# Patient Record
Sex: MALE | Race: WHITE | URBAN - NONMETROPOLITAN AREA
[De-identification: names, ages, dates, MRNs, and addresses within clinical notes are randomized per-mention and may not be internally consistent; named-entity substitution may affect disease eponyms.]

---

## 2021-02-15 ENCOUNTER — IMMUNIZATIONS (OUTPATIENT)
Dept: FAMILY MEDICINE CLINIC | Facility: HOSPITAL | Age: 76
End: 2021-02-15

## 2021-02-15 DIAGNOSIS — Z23 ENCOUNTER FOR IMMUNIZATION: Primary | ICD-10-CM

## 2021-02-15 PROCEDURE — 0011A SARS-COV-2 / COVID-19 MRNA VACCINE (MODERNA) 100 MCG: CPT

## 2021-02-15 PROCEDURE — 91301 SARS-COV-2 / COVID-19 MRNA VACCINE (MODERNA) 100 MCG: CPT

## 2021-03-15 ENCOUNTER — IMMUNIZATIONS (OUTPATIENT)
Dept: FAMILY MEDICINE CLINIC | Facility: HOSPITAL | Age: 76
End: 2021-03-15

## 2021-03-15 DIAGNOSIS — Z23 ENCOUNTER FOR IMMUNIZATION: Primary | ICD-10-CM

## 2021-03-15 PROCEDURE — 0012A SARS-COV-2 / COVID-19 MRNA VACCINE (MODERNA) 100 MCG: CPT

## 2021-03-15 PROCEDURE — 91301 SARS-COV-2 / COVID-19 MRNA VACCINE (MODERNA) 100 MCG: CPT

## 2023-09-29 ENCOUNTER — APPOINTMENT (EMERGENCY)
Dept: CT IMAGING | Facility: HOSPITAL | Age: 78
End: 2023-09-29
Payer: MEDICARE

## 2023-09-29 ENCOUNTER — HOSPITAL ENCOUNTER (EMERGENCY)
Facility: HOSPITAL | Age: 78
Discharge: HOME | End: 2023-09-29
Attending: EMERGENCY MEDICINE
Payer: MEDICARE

## 2023-09-29 VITALS
OXYGEN SATURATION: 98 % | DIASTOLIC BLOOD PRESSURE: 97 MMHG | WEIGHT: 155 LBS | TEMPERATURE: 97.6 F | HEART RATE: 82 BPM | SYSTOLIC BLOOD PRESSURE: 224 MMHG | RESPIRATION RATE: 16 BRPM

## 2023-09-29 DIAGNOSIS — S16.1XXA STRAIN OF NECK MUSCLE, INITIAL ENCOUNTER: Primary | ICD-10-CM

## 2023-09-29 PROCEDURE — G1004 CDSM NDSC: HCPCS

## 2023-09-29 PROCEDURE — 72125 CT NECK SPINE W/O DYE: CPT

## 2023-09-29 PROCEDURE — 99283 EMERGENCY DEPT VISIT LOW MDM: CPT

## 2023-09-29 RX ORDER — METHOCARBAMOL 500 MG/1
500 TABLET, FILM COATED ORAL 2 TIMES DAILY
Qty: 20 TABLET | Refills: 0 | Status: SHIPPED | OUTPATIENT
Start: 2023-09-29

## 2023-09-29 RX ORDER — NAPROXEN 375 MG/1
375 TABLET ORAL 2 TIMES DAILY WITH MEALS
Qty: 20 TABLET | Refills: 0 | Status: SHIPPED | OUTPATIENT
Start: 2023-09-29

## 2023-09-29 RX ORDER — PREDNISONE 20 MG/1
40 TABLET ORAL DAILY
Qty: 10 TABLET | Refills: 0 | Status: SHIPPED | OUTPATIENT
Start: 2023-09-29 | End: 2023-10-04

## 2023-09-29 RX ORDER — LIDOCAINE 50 MG/G
1 PATCH TOPICAL DAILY
Qty: 7 PATCH | Refills: 0 | Status: SHIPPED | OUTPATIENT
Start: 2023-09-29 | End: 2023-10-06

## 2023-09-29 NOTE — ED PROVIDER NOTES
History  Chief Complaint   Patient presents with   • Neck Pain     Pt reports pain in lower neck radiating into R arm beginning Wednesday. Reports he woke from sleep with pain. Pain increased with ROM. No known injury. Hx C spine sx in 2014      Patient states that he woke up Wednesday morning and turned his head when he developed pain on the right side of his neck. Does not radiate to the right arm. Worse with turning his head to the right shoulder. Took Tylenol without relief. Does have a slight headache. However, did have headaches prior to this. No change in speech or vision. No focal weakness or numbness. No trauma. No trouble swallowing. No fevers or chills. No weakness or numbness. History provided by:  Patient   used: No    Neck Pain  Pain location:  R side  Quality:  Aching  Pain severity:  Mild  Pain is:  Same all the time  Onset quality:  Sudden  Duration:  2 days  Timing:  Constant  Progression:  Unchanged  Chronicity:  New  Context comment:  Woke up and turned his head when he developed pain  Relieved by:  Nothing  Exacerbated by: Turning his head to the right shoulder. Ineffective treatments: Tylenol. Associated symptoms: headaches    Associated symptoms: no bladder incontinence, no bowel incontinence, no chest pain, no fever, no numbness, no paresis, no visual change, no weakness and no weight loss        None       History reviewed. No pertinent past medical history. History reviewed. No pertinent surgical history. History reviewed. No pertinent family history. I have reviewed and agree with the history as documented. E-Cigarette/Vaping     E-Cigarette/Vaping Substances     Social History     Tobacco Use   • Smoking status: Never   • Smokeless tobacco: Never   Substance Use Topics   • Alcohol use: Yes     Comment: moderately   • Drug use: Never       Review of Systems   Constitutional: Negative for chills, fever and weight loss.    HENT: Negative for ear pain, hearing loss, sore throat, trouble swallowing and voice change. Eyes: Negative for pain and discharge. Respiratory: Negative for cough, shortness of breath and wheezing. Cardiovascular: Negative for chest pain and palpitations. Gastrointestinal: Negative for abdominal pain, blood in stool, bowel incontinence, constipation, diarrhea, nausea and vomiting. Genitourinary: Negative for bladder incontinence, dysuria, flank pain, frequency and hematuria. Musculoskeletal: Positive for neck pain. Negative for joint swelling and neck stiffness. Skin: Negative for rash and wound. Neurological: Positive for headaches. Negative for dizziness, seizures, syncope, facial asymmetry, weakness and numbness. Psychiatric/Behavioral: Negative for hallucinations, self-injury and suicidal ideas. All other systems reviewed and are negative. Physical Exam  Physical Exam  Vitals and nursing note reviewed. Constitutional:       General: He is not in acute distress. Appearance: He is well-developed. HENT:      Head: Normocephalic and atraumatic. Right Ear: External ear normal.      Left Ear: External ear normal.   Eyes:      General: No scleral icterus. Right eye: No discharge. Left eye: No discharge. Extraocular Movements: Extraocular movements intact. Conjunctiva/sclera: Conjunctivae normal.   Neck:      Vascular: No carotid bruit. Comments: Decreased range of motion secondary to pain. Pain is reproducible with turning his head to the right shoulder. There is no bruit or thrill noted along the carotids. Trachea is midline. Cardiovascular:      Rate and Rhythm: Normal rate and regular rhythm. Heart sounds: Normal heart sounds. No murmur heard. Pulmonary:      Effort: Pulmonary effort is normal.      Breath sounds: Normal breath sounds. No wheezing or rales. Abdominal:      General: Bowel sounds are normal. There is no distension.       Palpations: Abdomen is soft. Tenderness: There is no abdominal tenderness. There is no guarding or rebound. Musculoskeletal:         General: No deformity. Normal range of motion. Cervical back: No rigidity. Lymphadenopathy:      Cervical: No cervical adenopathy. Skin:     General: Skin is warm and dry. Findings: No rash. Neurological:      General: No focal deficit present. Mental Status: He is alert and oriented to person, place, and time. Cranial Nerves: No cranial nerve deficit. Psychiatric:         Mood and Affect: Mood normal.         Behavior: Behavior normal.         Thought Content: Thought content normal.         Judgment: Judgment normal.         Vital Signs  ED Triage Vitals [09/29/23 1651]   Temperature Pulse Respirations Blood Pressure SpO2   97.6 °F (36.4 °C) 82 16 (!) 224/97 98 %      Temp src Heart Rate Source Patient Position - Orthostatic VS BP Location FiO2 (%)   -- -- Lying Right arm --      Pain Score       6           Vitals:    09/29/23 1651   BP: (!) 224/97   Pulse: 82   Patient Position - Orthostatic VS: Lying         Visual Acuity      ED Medications  Medications - No data to display    Diagnostic Studies  Results Reviewed     None                 CT cervical spine without contrast   Final Result by Kiara Interiano MD (09/29 1828)         1. Age indeterminant kyphosis of the mid cervical spine centered at the C5 level possibly postoperative and spondylotic in etiology given the adjacent extensive spinal construct with anterior fusion C3-C4 and posterior fusion C4-C6. Hardware appears    intact. Correlation with prior imaging recommended. If prior images become available for direct comparison, an addendum could be issued. Further clinical assessment advised. 2. Advanced multilevel spondylosis. 3. No acute fracture.                   Workstation performed: [id number]                    Procedures  Procedures         ED Course                               SBIRT 22yo+ Flowsheet Row Most Recent Value   Initial Alcohol Screen: US AUDIT-C     1. How often do you have a drink containing alcohol? 0 Filed at: 09/29/2023 1700   2. How many drinks containing alcohol do you have on a typical day you are drinking? 0 Filed at: 09/29/2023 1700   3b. FEMALE Any Age, or MALE 65+: How often do you have 4 or more drinks on one occassion? 0 Filed at: 09/29/2023 1700   Audit-C Score 0 Filed at: 09/29/2023 1700   CORY: How many times in the past year have you. .. Used an illegal drug or used a prescription medication for non-medical reasons? Never Filed at: 09/29/2023 1700                    Medical Decision Making  Amount and/or Complexity of Data Reviewed  Radiology: ordered. Decision-making details documented in ED Course. Discussion of management or test interpretation with external provider(s): Differential diagnosis includes but not limited to carotid dissection versus cervical strain versus torticollis versus disc disease. Patient has no neurologic symptoms. Radial pulses are 2+ bilaterally. There is no thrill or bruit noted on exam.  Doubt carotid dissection. Disposition  Final diagnoses:   Strain of neck muscle, initial encounter     Time reflects when diagnosis was documented in both MDM as applicable and the Disposition within this note     Time User Action Codes Description Comment    9/29/2023  5:21 PM Komal Marrero Add [S16. 1XXA] Strain of neck muscle, initial encounter       ED Disposition     ED Disposition   Discharge    Condition   Stable    Date/Time   Fri Sep 29, 2023  5:21 PM    Comment   Butch Hodges discharge to home/self care.                Follow-up Information     Follow up With Specialties Details Why Contact Info Additional Information    Nathan Michelle MD Internal Medicine Call in 3 days  [address] [place] [address] [address] [facility] Physical Therapy Call in 1 day  [phone] Patient's Medications   Discharge Prescriptions    LIDOCAINE (LIDODERM) 5 %    Apply 1 patch topically over 12 hours daily for 7 days Apply to neck area of pain. Remove & Discard patch within 12 hours or as directed by MD       Start Date: 9/29/2023 End Date: 10/6/2023       Order Dose: 1 patch       Quantity: 7 patch    Refills: 0    METHOCARBAMOL (ROBAXIN) 500 MG TABLET    Take 1 tablet (500 mg total) by mouth 2 (two) times a day       Start Date: 9/29/2023 End Date: --       Order Dose: 500 mg       Quantity: 20 tablet    Refills: 0    NAPROXEN (NAPROSYN) 375 MG TABLET    Take 1 tablet (375 mg total) by mouth 2 (two) times a day with meals       Start Date: 9/29/2023 End Date: --       Order Dose: 375 mg       Quantity: 20 tablet    Refills: 0    PREDNISONE 20 MG TABLET    Take 2 tablets (40 mg total) by mouth daily for 5 days       Start Date: 9/29/2023 End Date: 10/4/2023       Order Dose: 40 mg       Quantity: 10 tablet    Refills: 0       No discharge procedures on file.     PDMP Review     None          ED Provider  Electronically Signed by           Tito Jordan MD  09/29/23 5380

## 2023-10-02 ENCOUNTER — TELEPHONE (OUTPATIENT)
Dept: PHYSICAL THERAPY | Facility: OTHER | Age: 78
End: 2023-10-02

## 2023-10-02 NOTE — TELEPHONE ENCOUNTER
Call placed to the patient per [facility] referral.    Voice message left for patient to call back. Phone number and hours of business provided. This is the 1st attempt to reach the patient. Will defer per protocol. Pt was in ED for neck strain on 9/29/23. No referral entered on AVS to call.      Pt did call on 9/30/23 and LM @9:53AM, and another message at 10:18AM

## 2023-10-06 ENCOUNTER — EVALUATION (OUTPATIENT)
Dept: PHYSICAL THERAPY | Facility: CLINIC | Age: 78
End: 2023-10-06
Payer: MEDICARE

## 2023-10-06 DIAGNOSIS — M54.2 ACUTE NECK PAIN: ICD-10-CM

## 2023-10-06 PROCEDURE — 97535 SELF CARE MNGMENT TRAINING: CPT

## 2023-10-06 PROCEDURE — 97161 PT EVAL LOW COMPLEX 20 MIN: CPT

## 2023-10-06 NOTE — PROGRESS NOTES
PT Evaluation     Today's date: 10/6/2023  Patient name: Marleny Salcedo  : 1945  MRN: [id number]  Referring provider: Karla Barrera PT  Dx:   Encounter Diagnosis     ICD-10-CM    1. Acute neck pain  M54.2 Ambulatory referral to PT spine                     Assessment  Assessment details: Pt is a 70year old male who presents to OP PT for acute neck pain. He reports that he woke up one more a week ago to pain in his R upper shoulder. He was seen in [address] [place] [address] ED and was referred to the comp spine program. Upon examination, patient presents with decreased cervical ROM, WFL UE strength and severe tenderness in UT and LS area of R side. Due to his current impairments patient has difficulty with cervical spine motions, ADLs and functioning at his prior level. Pt was very active going to the gym but has been unable to attend since this incident. He is most likely dealing with an acute ms spasm and would benefit from OP PT services in order to address current impairments and functional limitations. Thank you for your referral!    Impairments: abnormal or restricted ROM, activity intolerance, impaired physical strength, lacks appropriate home exercise program and pain with function    Goals  STG (to be met within 4 weeks):  1. Pt will reports no more than 1/10 pain at worst in order to improve function   2. Pt will improve cervical extension ROM by at least 5* in order to improve cervical mobility  3. Pt will improve cervical R& L lateral flexion ROM by at least 5* in order to improve cervical mobility   4. Pt will improve cervical R& L rotation ROM in order to improve cervical mobility  5. Pt will improve FOTO score by at least 5 points in order to progress to prior level of function    LTG (to be met in 8 weeks):  1. Pt will report no more than 0/10 pain at worst in order to complete ADLs  2. Pt will be able to tolerate work related activities without radicular symptoms in order to improve function  3.  Pt will be able to perform ADLs and hobbies without pain in order to return to PLOF  4. Pt will achieve FOTO discharge in order to improve QOL     Plan  Patient would benefit from: skilled physical therapy  Planned modality interventions: thermotherapy: hydrocollator packs and cryotherapy  Planned therapy interventions: joint mobilization, manual therapy, neuromuscular re-education, patient education, strengthening, stretching, therapeutic exercise, home exercise program and balance  Frequency: 2x week  Duration in weeks: 6  Treatment plan discussed with: patient        Subjective Evaluation    History of Present Illness  Mechanism of injury: Patient had fusion neck surgery in  and . About 6 months ago, he sees his neurologist and talked about a sharp pain that he would get on the R side of his head. He was told it was from his surgery. One week ago he had a shot of pain into his R shoulder and side of his R head. He contacted the neurologist again but he was not in. He was taken to the ED for CT scan by a friend, showing increased scar tissue and arthritis. Prescribed medications and notes that he is starting to feel a little better. Continues with pain in his upper shoulder area  Patient Goals  Patient goals for therapy: increased strength, independence with ADLs/IADLs, return to sport/leisure activities, decreased pain and increased motion    Pain  Current pain ratin  At best pain ratin  At worst pain ratin  Location: R UT, R temporal area  Quality: discomfort, dull ache and tight  Aggravating factors: overhead activity and lifting    Treatments  Current treatment: medication and physical therapy        Objective     Palpation     Right   Muscle spasm in the levator scapulae and upper trapezius. Tenderness of the levator scapulae, middle trapezius and upper trapezius.      Neurological Testing     Sensation   Cervical/Thoracic   Left   Intact: light touch    Right   Intact: light touch    Additional Neurological Details  Reports decreased sensation in hands from previous surgeries    Active Range of Motion   Cervical/Thoracic Spine       Cervical    Flexion:  [facility]  Extension: 48 degrees      Left lateral flexion: 17 degrees      Right lateral flexion: 31 degrees      Left rotation:  Restriction level: minimal  Right rotation:  [facility]    Strength/Myotome Testing     Left Shoulder     Planes of Motion   Flexion: 4   Abduction: 4   External rotation at 0°: 4-   Internal rotation at 0°: 4+     Right Shoulder     Planes of Motion   Flexion: 4   Abduction: 4   External rotation at 0°: 4-   Internal rotation at 0°: 4+              Precautions: PSH cervical fusion, lumbar laminectomy, CA  POC Expiration: 11/6/23  Manuals 10/6       Cervical stretching        STM R UT                        Neuro Re-Ed        Chin Tucks        Retractions @ wall        Damien push/pull                                        TherEx        UBE to improve  ROM/postural awareness        Caudel glide        LS stretch        Doorway stretch        Tband HA, B ER                                Instructed HEP & education 10'       Modalities

## 2023-10-06 NOTE — LETTER
2023    Indu Wheat, [address]  [address] [place] [facility].    Patient: Daksha Feliciano   YOB: 1945   Date of Visit: 10/6/2023     Encounter Diagnosis     ICD-10-CM    1. Acute neck pain  M54.2 Ambulatory referral to PT spine          Dear Dr. Laura Hodges:    Thank you for your recent referral of Daksha Feliciano. Please review the attached evaluation summary from Butch's recent visit. Please verify that you agree with the plan of care by signing the attached order. If you have any questions or concerns, please do not hesitate to call. I sincerely appreciate the opportunity to share in the care of one of your patients and hope to have another opportunity to work with you in the near future. Sincerely,    Quentin Wilkinson, PT      Referring Provider:      I certify that I have read the below Plan of Care and certify the need for these services furnished under this plan of treatment while under my care. Indu Wheat MD  [address] [address]  Via Fax: [phone]          PT Evaluation     Today's date: 10/6/2023  Patient name: Daksha Feliciano  : 1945  MRN: [id number]  Referring provider: Kal Sibley PT  Dx:   Encounter Diagnosis     ICD-10-CM    1. Acute neck pain  M54.2 Ambulatory referral to PT spine                     Assessment  Assessment details: Pt is a 70year old male who presents to OP PT for acute neck pain. He reports that he woke up one more a week ago to pain in his R upper shoulder. He was seen in [address] [place] [address] ED and was referred to the comp spine program. Upon examination, patient presents with decreased cervical ROM, WFL UE strength and severe tenderness in UT and LS area of R side. Due to his current impairments patient has difficulty with cervical spine motions, ADLs and functioning at his prior level. Pt was very active going to the gym but has been unable to attend since this incident.  He is most likely dealing with an acute ms spasm and would benefit from OP PT services in order to address current impairments and functional limitations. Thank you for your referral!    Impairments: abnormal or restricted ROM, activity intolerance, impaired physical strength, lacks appropriate home exercise program and pain with function    Goals  STG (to be met within 4 weeks):  1. Pt will reports no more than 1/10 pain at worst in order to improve function   2. Pt will improve cervical extension ROM by at least 5* in order to improve cervical mobility  3. Pt will improve cervical R& L lateral flexion ROM by at least 5* in order to improve cervical mobility   4. Pt will improve cervical R& L rotation ROM in order to improve cervical mobility  5. Pt will improve FOTO score by at least 5 points in order to progress to prior level of function    LTG (to be met in 8 weeks):  1. Pt will report no more than 0/10 pain at worst in order to complete ADLs  2. Pt will be able to tolerate work related activities without radicular symptoms in order to improve function  3. Pt will be able to perform ADLs and hobbies without pain in order to return to PLOF  4. Pt will achieve FOTO discharge in order to improve QOL     Plan  Patient would benefit from: skilled physical therapy  Planned modality interventions: thermotherapy: hydrocollator packs and cryotherapy  Planned therapy interventions: joint mobilization, manual therapy, neuromuscular re-education, patient education, strengthening, stretching, therapeutic exercise, home exercise program and balance  Frequency: 2x week  Duration in weeks: 6  Treatment plan discussed with: patient        Subjective Evaluation    History of Present Illness  Mechanism of injury: Patient had fusion neck surgery in 2014 and 2015. About 6 months ago, he sees his neurologist and talked about a sharp pain that he would get on the R side of his head. He was told it was from his surgery.  One week ago he had a shot of pain into his R shoulder and side of his R head. He contacted the neurologist again but he was not in. He was taken to the ED for CT scan by a friend, showing increased scar tissue and arthritis. Prescribed medications and notes that he is starting to feel a little better. Continues with pain in his upper shoulder area  Patient Goals  Patient goals for therapy: increased strength, independence with ADLs/IADLs, return to sport/leisure activities, decreased pain and increased motion    Pain  Current pain ratin  At best pain ratin  At worst pain ratin  Location: R UT, R temporal area  Quality: discomfort, dull ache and tight  Aggravating factors: overhead activity and lifting    Treatments  Current treatment: medication and physical therapy        Objective     Palpation     Right   Muscle spasm in the levator scapulae and upper trapezius. Tenderness of the levator scapulae, middle trapezius and upper trapezius.      Neurological Testing     Sensation   Cervical/Thoracic   Left   Intact: light touch    Right   Intact: light touch    Additional Neurological Details  Reports decreased sensation in hands from previous surgeries    Active Range of Motion   Cervical/Thoracic Spine       Cervical    Flexion:  WFL  Extension: 48 degrees      Left lateral flexion: 17 degrees      Right lateral flexion: 31 degrees      Left rotation:  Restriction level: minimal  Right rotation:  [facility]    Strength/Myotome Testing     Left Shoulder     Planes of Motion   Flexion: 4   Abduction: 4   External rotation at 0°: 4-   Internal rotation at 0°: 4+     Right Shoulder     Planes of Motion   Flexion: 4   Abduction: 4   External rotation at 0°: 4-   Internal rotation at 0°: 4+             Precautions: PSH cervical fusion, lumbar laminectomy, CA  POC Expiration: 23  Manuals 10/6       Cervical stretching        [facility] R UT                        Neuro Re-Ed        Chin Fanta        Retractions @ wall        [place] push/pull TherEx        UBE to improve  ROM/postural awareness        Caudel glide        LS stretch        Doorway stretch        Tband HA, B ER                                Instructed HEP & education 10'       Modalities

## 2023-10-13 ENCOUNTER — OFFICE VISIT (OUTPATIENT)
Dept: PHYSICAL THERAPY | Facility: CLINIC | Age: 78
End: 2023-10-13
Payer: MEDICARE

## 2023-10-13 DIAGNOSIS — M54.2 ACUTE NECK PAIN: Primary | ICD-10-CM

## 2023-10-13 PROCEDURE — 97110 THERAPEUTIC EXERCISES: CPT

## 2023-10-13 PROCEDURE — 97112 NEUROMUSCULAR REEDUCATION: CPT

## 2023-10-13 PROCEDURE — 97140 MANUAL THERAPY 1/> REGIONS: CPT

## 2023-10-13 NOTE — PROGRESS NOTES
Daily Note     Today's date: 10/13/2023  Patient name: Rajani Parker  : 1945  MRN: [id number]  Referring provider: Nory Aguilar DO  Dx:   Encounter Diagnosis     ICD-10-CM    1. Acute neck pain  M54.2                      Subjective: Pt reports he is consistently performing stretches at home      Objective: See treatment diary below      Assessment:  Pt tolerated treatment session fairly well. Continues with ms spasm and tenderness in R UT. Verbal cueing to engage postural muscles and prevent shoulder hiking. Pt would benefit from continued OP PT services. Plan: Continue per plan of care. Precautions: PSH cervical fusion, lumbar laminectomy, CA  POC Expiration: 23  Manuals 10/6 10/13      Cervical stretching  8'      STM R UT  7'                      Neuro Re-Ed        [employer]        Retractions @ wall  15x :05      [place] push/pull  12# 10x ea                                      TherEx        UBE to improve  ROM/postural awareness  1.2 alt 8'      Caudel glide  :15x3 bilat      LS stretch  15x3 bilat      Doorway stretch  :15x3      Tband HA, B ER                                Instructed HEP & education 10'       Modalities                   Access Code: [email or web address]  URL: [email or web address]  Date: 10/13/2023  Prepared by: Dante Sharma    Exercises  - Standing Shoulder Horizontal Abduction with Resistance  - 2 x daily - 7 x weekly - 2 sets - 10 reps  - Shoulder External Rotation and Scapular Retraction with Resistance  - 2 x daily - 7 x weekly - 2 sets - 10 reps

## 2023-10-17 NOTE — PROGRESS NOTES
Daily Note     Today's date: 10/18/2023  Patient name: Emre James  : 1945  MRN: [id number]  Referring provider: Andre Burgess DO  Dx:   Encounter Diagnosis     ICD-10-CM    1. Acute neck pain  M54.2                      Subjective: Pt reports he is doing well with his stretching, wants to try gym workout today      Objective: See treatment diary below      Assessment:  Pt tolerated treatment session fairly well. Soft tissue improving as evidence by decreased ms spasm, cervical ROM also improving. Minimal v/c given for [place] exercises to prevent shoulder elevation. Pt would benefit from continued OP PT services. Plan: Continue per plan of care. Precautions: PSH cervical fusion, lumbar laminectomy, CA  POC Expiration: 23  Manuals 10/6 10/13 10/18     Cervical stretching  8' 8'     STM R UT  7' 7'                     Neuro Re-Ed        Chin Tucks        Retractions @ wall  15x :05 15x :05     Damien push/pull  12# 10x ea 14# 2x10 ea     Damien LPD   12# 2x10                             TherEx        UBE to improve  ROM/postural awareness  1.2 alt 8' 1.3 alt 8'     Caudel glide  :15x3 bilat HEP     LS stretch  15x3 bilat HEP     Doorway stretch  :15x3 :15x3      Tband HA, B ER   GTB 2x10                             Instructed HEP & education 8'  5'     Modalities                   Access Code: [email or web address]  URL: [email or web address]  Date: 10/13/2023  Prepared by: Katey Park    Exercises  - Standing Shoulder Horizontal Abduction with Resistance  - 2 x daily - 7 x weekly - 2 sets - 10 reps  - Shoulder External Rotation and Scapular Retraction with Resistance  - 2 x daily - 7 x weekly - 2 sets - 10 reps

## 2023-10-18 ENCOUNTER — OFFICE VISIT (OUTPATIENT)
Dept: PHYSICAL THERAPY | Facility: CLINIC | Age: 78
End: 2023-10-18
Payer: MEDICARE

## 2023-10-18 DIAGNOSIS — M54.2 ACUTE NECK PAIN: Primary | ICD-10-CM

## 2023-10-18 PROCEDURE — 97110 THERAPEUTIC EXERCISES: CPT

## 2023-10-18 PROCEDURE — 97140 MANUAL THERAPY 1/> REGIONS: CPT

## 2023-10-18 PROCEDURE — 97112 NEUROMUSCULAR REEDUCATION: CPT

## 2023-10-19 NOTE — PROGRESS NOTES
Daily Note     Today's date: 10/19/2023  Patient name: Rodger Jose  : 1945  MRN: [id number]  Referring provider: Fela Marquez, PT  Dx:   Encounter Diagnosis     ICD-10-CM    1. Acute neck pain  M54.2                      Subjective: Pt reports that he was able to go to the gym Wednesday and modify his program without issue      Objective: See treatment diary below      Assessment:  Pt tolerated treatment session fairly well. Responding well to manual stretching with improvements noted in cervical ROM. Also demonstrating improved techniques with less cueing during activity. Pt would benefit from continued OP PT services. Plan: Continue per plan of care. Precautions: PSH cervical fusion, lumbar laminectomy, CA  POC Expiration: 23  Manuals 10/6 10/13 10/18 10/20    Cervical stretching  8' 8' 15'    STM R UT  7' 7'                     Neuro Re-Ed        Chin Tucks        Retractions @ wall  15x :05 15x :05 15x :05    [place] push/pull  12# 10x ea 14# 2x10 ea 14# 2x10 ea    [place] LPD   12# 2x10 12# 2x10    Push/pull cart    10# 2 laps                    TherEx        UBE to improve  ROM/postural awareness  1.2 alt 8' 1.3 alt 8' 1.3 alt 10'    Caudel glide  :15x3 bilat HEP     LS stretch  15x3 bilat HEP     Doorway stretch  :15x3 :15x3  :15x3     Tband HA, B ER   GTB 2x10 GTB 2x10                            Instructed HEP & education 8'  5'     Modalities                   Access Code: [email or web address]  URL: [email or web address]  Date: 10/13/2023  Prepared by: Susana Barker    Exercises  - Standing Shoulder Horizontal Abduction with Resistance  - 2 x daily - 7 x weekly - 2 sets - 10 reps  - Shoulder External Rotation and Scapular Retraction with Resistance  - 2 x daily - 7 x weekly - 2 sets - 10 reps

## 2023-10-20 ENCOUNTER — OFFICE VISIT (OUTPATIENT)
Dept: PHYSICAL THERAPY | Facility: CLINIC | Age: 78
End: 2023-10-20
Payer: MEDICARE

## 2023-10-20 DIAGNOSIS — M54.2 ACUTE NECK PAIN: Primary | ICD-10-CM

## 2023-10-20 PROCEDURE — 97140 MANUAL THERAPY 1/> REGIONS: CPT

## 2023-10-20 PROCEDURE — 97112 NEUROMUSCULAR REEDUCATION: CPT

## 2023-10-20 PROCEDURE — 97110 THERAPEUTIC EXERCISES: CPT

## 2023-10-25 ENCOUNTER — OFFICE VISIT (OUTPATIENT)
Dept: PHYSICAL THERAPY | Facility: CLINIC | Age: 78
End: 2023-10-25
Payer: MEDICARE

## 2023-10-25 DIAGNOSIS — M54.2 ACUTE NECK PAIN: Primary | ICD-10-CM

## 2023-10-25 PROCEDURE — 97112 NEUROMUSCULAR REEDUCATION: CPT

## 2023-10-25 PROCEDURE — 97110 THERAPEUTIC EXERCISES: CPT

## 2023-10-25 PROCEDURE — 97140 MANUAL THERAPY 1/> REGIONS: CPT

## 2023-10-25 NOTE — PROGRESS NOTES
Daily Note     Today's date: 10/25/2023  Patient name: Justine Parnell  : 1945  MRN: [id number]  Referring provider: Crystal Lynne DO  Dx:   Encounter Diagnosis     ICD-10-CM    1. Acute neck pain  M54.2                      Subjective: Pt reports he was able to go to the gym 2x this week without incident      Objective: See treatment diary below      Assessment:  Pt tolerated treatment session fairly well. ROM improving and tolerance to UE strengthening exercises with postural awareness improving. Pt would benefit from continued OP PT services. Plan: Continue per plan of care. Precautions: PSH cervical fusion, lumbar laminectomy, CA  POC Expiration: 23  Manuals 10/6 10/13 10/18 10/20 10/25   Cervical stretching  8' 8' 15' 15'   STM R UT  7' 7'                     Neuro Re-Ed        [place] chop     7# 10x bilat   Retractions @ wall  15x :05 15x :05 15x :05 15x :05   [place] push/pull  12# 10x ea 14# 2x10 ea 14# 2x10 ea 14# 2x10 ea   [place] LPD   12# 2x10 12# 2x10 15# 2x10   Push/pull cart    10# 2 laps 10# 2 laps                   TherEx        UBE to improve  ROM/postural awareness  1.2 alt 8' 1.3 alt 8' 1.3 alt 10' 1.3 alt 10'   Caudel glide  :15x3 bilat HEP     LS stretch  15x3 bilat HEP     Doorway stretch  :15x3 :15x3  :15x3  :15x3    Tband HA, B ER   GTB 2x10 GTB 2x10 GTB 2x10                           Instructed HEP & education 8'  5'     Modalities                   Access Code: [email or web address]  URL: [email or web address]  Date: 10/13/2023  Prepared by: [facility]    Exercises  - Standing Shoulder Horizontal Abduction with Resistance  - 2 x daily - 7 x weekly - 2 sets - 10 reps  - Shoulder External Rotation and Scapular Retraction with Resistance  - 2 x daily - 7 x weekly - 2 sets - 10 reps

## 2023-10-27 ENCOUNTER — OFFICE VISIT (OUTPATIENT)
Dept: PHYSICAL THERAPY | Facility: CLINIC | Age: 78
End: 2023-10-27
Payer: MEDICARE

## 2023-10-27 DIAGNOSIS — M54.2 ACUTE NECK PAIN: Primary | ICD-10-CM

## 2023-10-27 PROCEDURE — 97110 THERAPEUTIC EXERCISES: CPT

## 2023-10-27 PROCEDURE — 97140 MANUAL THERAPY 1/> REGIONS: CPT

## 2023-10-27 PROCEDURE — 97112 NEUROMUSCULAR REEDUCATION: CPT

## 2023-10-27 NOTE — PROGRESS NOTES
Daily Note     Today's date: 10/27/2023  Patient name: Conner Swenson  : 1945  MRN: [id number]  Referring provider: Anh Krishna, PT  Dx:   Encounter Diagnosis     ICD-10-CM    1. Acute neck pain  M54.2                      Subjective: Pt reports he has been going to gym without issue      Objective: See treatment diary below      Assessment:  Pt tolerated treatment session fairly well. Tolerating activity very well without adverse effects. Overall progressing well towards goals. Pt would benefit from continued OP PT services. Plan: Continue per plan of care. Precautions: PSH cervical fusion, lumbar laminectomy, CA  POC Expiration: 23  Manuals 10/27 10/13 10/18 10/20 10/25   Cervical stretching 15' 8' 8' 15' 15'   STM R UT  7' 7'                     Neuro Re-Ed        [place] chop 7# 10x bilat    7# 10x bilat   Retractions @ wall 15x :05 15x :05 15x :05 15x :05 15x :05   [place] push/pull 4# 2x10 ea 12# 10x ea 14# 2x10 ea 14# 2x10 ea 14# 2x10 ea   [place] LPD 15# 2x10  12# 2x10 12# 2x10 15# 2x10   Push/pull cart 10# 2 laps   10# 2 laps 10# 2 laps                   TherEx        UBE to improve  ROM/postural awareness 1.3 alt 10' 1.2 alt 8' 1.3 alt 8' 1.3 alt 10' 1.3 alt 10'   Caudel glide  :15x3 bilat HEP     LS stretch  15x3 bilat HEP     Doorway stretch :15x3  :15x3 :15x3  :15x3  :15x3    Tband HA, B ER GTB 2x10  GTB 2x10 GTB 2x10 GTB 2x10                           Instructed HEP & education   5'     Modalities                   Access Code: [email or web address]  URL: [email or web address]  Date: 10/13/2023  Prepared by: Rajat Yusuf    Exercises  - Standing Shoulder Horizontal Abduction with Resistance  - 2 x daily - 7 x weekly - 2 sets - 10 reps  - Shoulder External Rotation and Scapular Retraction with Resistance  - 2 x daily - 7 x weekly - 2 sets - 10 reps

## 2023-10-31 NOTE — PROGRESS NOTES
PT Re-Evaluation     Today's date: 2023  Patient name: Radha Bain  : 1945  MRN: [id number]  Referring provider: Adamaris Solorzano DO  Dx:   Encounter Diagnosis     ICD-10-CM    1. Acute neck pain  M54.2                        Assessment  Assessment details: Pt has attended a total of 7 PT sessions and has made great progress towards goals. He has made progress regarding cervical ROM, UE strength and denies tenderness throughout R side neck. Pt would benefit from one additional OP PT session in order to effectively transition to HEP. Thank you! Impairments: lacks appropriate home exercise program    Goals  STG (to be met within 4 weeks):  1. Pt will reports no more than 1/10 pain at worst in order to improve function   met  2. Pt will improve cervical extension ROM by at least 5* in order to improve cervical mobility  met  3. Pt will improve cervical R& L lateral flexion ROM by at least 5* in order to improve cervical mobility   met  4. Pt will improve cervical R& L rotation ROM in order to improve cervical mobility  met  5. Pt will improve FOTO score by at least 5 points in order to progress to prior level of function  met    LTG (to be met in 8 weeks):  1. Pt will report no more than 0/10 pain at worst in order to complete ADLs  met  2. Pt will be able to tolerate work related activities without radicular symptoms in order to improve function  met  3. Pt will be able to perform ADLs and hobbies without pain in order to return to PLOF  met  4. Pt will achieve FOTO discharge in order to improve QOL   met        Subjective Evaluation    History of Present Illness  Mechanism of injury: Patient reports nearly 90% improvements since starting PT. He notes he has been returning to the gym without limitaitons.   Patient Goals  Patient goals for therapy: increased strength, independence with ADLs/IADLs, return to sport/leisure activities, decreased pain and increased motion    Pain  Current pain ratin  At best pain ratin  At worst pain ratin    Treatments  Current treatment: physical therapy        Objective     Palpation     Right   No palpable tenderness to the levator scapulae, middle trapezius and upper trapezius.      Neurological Testing     Sensation   Cervical/Thoracic   Left   Intact: light touch    Right   Intact: light touch    Additional Neurological Details  Reports decreased sensation in hands from previous surgeries    Active Range of Motion   Cervical/Thoracic Spine       Cervical    Flexion:  WFL  Extension: 44 degrees      Left lateral flexion: 24 degrees      Right lateral flexion: 31 degrees      Left rotation:  WFL  Right rotation:  [facility]    Strength/Myotome Testing     Left Shoulder     Planes of Motion   Flexion: 4+   Abduction: 4+   External rotation at 0°: 4+   Internal rotation at 0°: 4+     Right Shoulder     Planes of Motion   Flexion: 4+   Abduction: 4+   External rotation at 0°: 4+   Internal rotation at 0°: 4+              Precautions: PSH cervical fusion, lumbar laminectomy, CA  POC Expiration: 23  Manuals 10/27 11/1 10/18 10/20 10/25   Cervical stretching 15' 15' 8' 15' 15'   STM R UT   7'                     Neuro Re-Ed        [place] chop 7# 10x bilat 7# 10x bilat    7# 10x bilat   Retractions @ wall 15x :05 15x :05 15x :05 15x :05 15x :05   [place] push/pull 14# 2x10 ea 14# 2x10 ea 14# 2x10 ea 14# 2x10 ea 14# 2x10 ea   [place] LPD 15# 2x10 15# 2x10 12# 2x10 12# 2x10 15# 2x10   Push/pull cart 10# 2 laps NT  10# 2 laps 10# 2 laps                   TherEx        UBE to improve  ROM/postural awareness 1.3 alt 10' 1.3 alt 10' 1.3 alt 8' 1.3 alt 10' 1.3 alt 10'   Caudel glide   HEP     LS stretch   HEP     Doorway stretch :15x3  :15x3 :15x3  :15x3  :15x3    Tband HA, B ER GTB 2x10 GTB 2x10 GTB 2x10 GTB 2x10 GTB 2x10                           Instructed HEP & education   5'     Modalities

## 2023-11-01 ENCOUNTER — EVALUATION (OUTPATIENT)
Dept: PHYSICAL THERAPY | Facility: CLINIC | Age: 78
End: 2023-11-01
Payer: MEDICARE

## 2023-11-01 DIAGNOSIS — M54.2 ACUTE NECK PAIN: Primary | ICD-10-CM

## 2023-11-01 PROCEDURE — 97110 THERAPEUTIC EXERCISES: CPT

## 2023-11-01 PROCEDURE — 97112 NEUROMUSCULAR REEDUCATION: CPT

## 2023-11-01 PROCEDURE — 97140 MANUAL THERAPY 1/> REGIONS: CPT

## 2023-11-01 NOTE — LETTER
2023    Yennifer Osorio DO  [address] [place] [id number]    Patient: Inocencia Granados   YOB: 1945   Date of Visit: 2023     Encounter Diagnosis     ICD-10-CM    1. Acute neck pain  M54.2           Dear Dr. Tera Rust:    Thank you for your recent referral of Inocencia Granados. Please review the attached evaluation summary from Butch's recent visit. Please verify that you agree with the plan of care by signing the attached order. If you have any questions or concerns, please do not hesitate to call. I sincerely appreciate the opportunity to share in the care of one of your patients and hope to have another opportunity to work with you in the near future. Sincerely,    Natalie Felix, PT      Referring Provider:      I certify that I have read the below Plan of Care and certify the need for these services furnished under this plan of treatment while under my care. Yennifer Osorio DO  [address] [place] [id number]  Via Fax: [phone]          PT Re-Evaluation     Today's date: 10/6/2023  Patient name: Inocencia Granados  : 1945  MRN: [id number]  Referring provider: Yennifer Osorio DO  Dx:   Encounter Diagnosis     ICD-10-CM    1. Acute neck pain  M54.2                        Assessment  Assessment details: Pt has attended a total of 7 PT sessions and has made great progress towards goals. He has made progress regarding cervical ROM, UE strength and denies tenderness throughout R side neck. Pt would benefit from one additional OP PT session in order to effectively transition to HEP. Thank you! Impairments: lacks appropriate home exercise program    Goals  STG (to be met within 4 weeks):  1. Pt will reports no more than 1/10 pain at worst in order to improve function   met  2. Pt will improve cervical extension ROM by at least 5* in order to improve cervical mobility  met  3.  Pt will improve cervical R& L lateral flexion ROM by at least 5* in order to improve cervical mobility   met  4. Pt will improve cervical R& L rotation ROM in order to improve cervical mobility  met  5. Pt will improve FOTO score by at least 5 points in order to progress to prior level of function  met    LTG (to be met in 8 weeks):  1. Pt will report no more than 0/10 pain at worst in order to complete ADLs  met  2. Pt will be able to tolerate work related activities without radicular symptoms in order to improve function  met  3. Pt will be able to perform ADLs and hobbies without pain in order to return to PLOF  met  4. Pt will achieve FOTO discharge in order to improve QOL   met        Subjective Evaluation    History of Present Illness  Mechanism of injury: Patient reports nearly 90% improvements since starting PT. He notes he has been returning to the gym without limitaitons. Patient Goals  Patient goals for therapy: increased strength, independence with ADLs/IADLs, return to sport/leisure activities, decreased pain and increased motion    Pain  Current pain ratin  At best pain ratin  At worst pain ratin    Treatments  Current treatment: physical therapy        Objective     Palpation     Right   No palpable tenderness to the levator scapulae, middle trapezius and upper trapezius.      Neurological Testing     Sensation   Cervical/Thoracic   Left   Intact: light touch    Right   Intact: light touch    Additional Neurological Details  Reports decreased sensation in hands from previous surgeries    Active Range of Motion   Cervical/Thoracic Spine       Cervical    Flexion:  WFL  Extension: 44 degrees      Left lateral flexion: 24 degrees      Right lateral flexion: 31 degrees      Left rotation:  WFL  Right rotation:  [facility]    Strength/Myotome Testing     Left Shoulder     Planes of Motion   Flexion: 4+   Abduction: 4+   External rotation at 0°: 4+   Internal rotation at 0°: 4+     Right Shoulder     Planes of Motion   Flexion: 4+   Abduction: 4+   External rotation at 0°: 4+   Internal rotation at 0°: 4+              Precautions: PSH cervical fusion, lumbar laminectomy, CA  POC Expiration: 12/1/23  Manuals 10/27 11/1 10/18 10/20 10/25   Cervical stretching 15' 15' 8' 15' 15'   STM R UT   7'                     Neuro Re-Ed        [place] chop 7# 10x bilat 7# 10x bilat    7# 10x bilat   Retractions @ wall 15x :05 15x :05 15x :05 15x :05 15x :05   [place] push/pull 14# 2x10 ea 14# 2x10 ea 14# 2x10 ea 14# 2x10 ea 14# 2x10 ea   [place] LPD 15# 2x10 15# 2x10 12# 2x10 12# 2x10 15# 2x10   Push/pull cart 10# 2 laps NT  10# 2 laps 10# 2 laps                   TherEx        UBE to improve  ROM/postural awareness 1.3 alt 10' 1.3 alt 10' 1.3 alt 8' 1.3 alt 10' 1.3 alt 10'   Caudel glide   HEP     LS stretch   HEP     Doorway stretch :15x3  :15x3 :15x3  :15x3  :15x3    Tband HA, B ER GTB 2x10 GTB 2x10 GTB 2x10 GTB 2x10 GTB 2x10                           Instructed HEP & education   5'     Modalities

## 2023-11-03 ENCOUNTER — OFFICE VISIT (OUTPATIENT)
Dept: PHYSICAL THERAPY | Facility: CLINIC | Age: 78
End: 2023-11-03
Payer: MEDICARE

## 2023-11-03 DIAGNOSIS — M54.2 ACUTE NECK PAIN: Primary | ICD-10-CM

## 2023-11-03 PROCEDURE — 97140 MANUAL THERAPY 1/> REGIONS: CPT

## 2023-11-03 PROCEDURE — 97535 SELF CARE MNGMENT TRAINING: CPT

## 2023-11-03 PROCEDURE — 97110 THERAPEUTIC EXERCISES: CPT

## 2023-11-03 NOTE — PROGRESS NOTES
PT Re-Evaluation     Today's date: 11/3/2023  Patient name: Stefany Reina  : 1945  MRN: [id number]  Referring provider: Margy Thomas DO  Dx:   Encounter Diagnosis     ICD-10-CM    1. Acute neck pain  M54.2                        Assessment  Assessment details: Pt has attended a total of 8 PT sessions and has made adequate progress towards goals to be d/c from PT services. PT reviewed and instructed HEP (handout provided) along with answering pt questions regarding current functional status. Pt has no concerns at time of discharge. Thank you! Goals  STG (to be met within 4 weeks):  1. Pt will reports no more than 1/10 pain at worst in order to improve function   met  2. Pt will improve cervical extension ROM by at least 5* in order to improve cervical mobility  met  3. Pt will improve cervical R& L lateral flexion ROM by at least 5* in order to improve cervical mobility   met  4. Pt will improve cervical R& L rotation ROM in order to improve cervical mobility  met  5. Pt will improve FOTO score by at least 5 points in order to progress to prior level of function  met    LTG (to be met in 8 weeks):  1. Pt will report no more than 0/10 pain at worst in order to complete ADLs  met  2. Pt will be able to tolerate work related activities without radicular symptoms in order to improve function  met  3. Pt will be able to perform ADLs and hobbies without pain in order to return to PLOF  met  4. Pt will achieve FOTO discharge in order to improve QOL   met        Subjective Evaluation    History of Present Illness  Mechanism of injury: Patient reports he continues to feel good and is prepared to transition to HEP  Pain  Current pain ratin  At best pain ratin  At worst pain ratin          Objective     Palpation     Right   No palpable tenderness to the levator scapulae, middle trapezius and upper trapezius.      Neurological Testing     Sensation   Cervical/Thoracic   Left   Intact: light touch    Right   Intact: light touch    Additional Neurological Details  Reports decreased sensation in hands from previous surgeries    Active Range of Motion   Cervical/Thoracic Spine       Cervical    Flexion:  WFL  Extension: 44 degrees      Left lateral flexion: 24 degrees      Right lateral flexion: 31 degrees      Left rotation:  WFL  Right rotation:  [facility]    Strength/Myotome Testing     Left Shoulder     Planes of Motion   Flexion: 4+   Abduction: 4+   External rotation at 0°: 4+   Internal rotation at 0°: 4+     Right Shoulder     Planes of Motion   Flexion: 4+   Abduction: 4+   External rotation at 0°: 4+   Internal rotation at 0°: 4+              Precautions: PSH cervical fusion, lumbar laminectomy, CA  POC Expiration: 12/1/23  Manuals 10/27 11/1 10/18 10/20 10/25   Cervical stretching 15' 15' 8' 15' 15'   STM R UT   7'                     Neuro Re-Ed        [place] chop 7# 10x bilat 7# 10x bilat    7# 10x bilat   Retractions @ wall 15x :05 15x :05 15x :05 15x :05 15x :05   [place] push/pull 14# 2x10 ea 14# 2x10 ea 14# 2x10 ea 14# 2x10 ea 14# 2x10 ea   [place] LPD 15# 2x10 15# 2x10 12# 2x10 12# 2x10 15# 2x10   Push/pull cart 10# 2 laps NT  10# 2 laps 10# 2 laps                   TherEx        UBE to improve  ROM/postural awareness 1.3 alt 10' 1.3 alt 10' 1.3 alt 8' 1.3 alt 10' 1.3 alt 10'   Caudel glide   HEP     LS stretch   HEP     Doorway stretch :15x3  :15x3 :15x3  :15x3  :15x3    Tband HA, B ER GTB 2x10 GTB 2x10 GTB 2x10 GTB 2x10 GTB 2x10                           Instructed HEP & education   5'     Modalities

## 2023-11-03 NOTE — PROGRESS NOTES
Daily Note     Today's date: 11/3/2023  Patient name: Leoncio Alexis  : 1945  MRN: [id number]  Referring provider: Varinder Whitehead DO  Dx:   Encounter Diagnosis     ICD-10-CM    1. Acute neck pain  M54.2                      Subjective: Patient reports that he is doing well and pleased with improvement in his neck since attending OP PT. Objective: See treatment diary below      Assessment: Tolerated treatment well. Patient  to transition from OP PT to HEP today. Patient educated on and demonstrated a good understanding of his HEP. Patient compliance with HEP will help maintain gains made here OP PT. Plan:  Patient discharged to HEP at this time.      Precautions: PSH cervical fusion, lumbar laminectomy, CA  POC Expiration: 23  Manuals 10/27 11/1 11/3  10/25   Cervical stretching 15' 15' 10'  15'   STM R UT   5'                     Neuro Re-Ed   11/3     [place] chop 7# 10x bilat 7# 10x bilat    7# 10x bilat   Retractions @ wall 15x :05 15x :05   15x :05   [place] push/pull 14# 2x10 ea 14# 2x10 ea   14# 2x10 ea   [place] LPD 15# 2x10 15# 2x10   15# 2x10   Push/pull cart 10# 2 laps NT   10# 2 laps                   TherEx   11/3     UBE to improve  ROM/postural awareness 1.3 alt 10' 1.3 alt 10' 1.3alt 10'  1.3 alt 10'   Caudel glide        LS stretch        Doorway stretch :15x3  :15x3   :15x3    Tband HA, B ER GTB 2x10 GTB 2x10   GTB 2x10                           Instructed HEP & education   13'     Modalities    11/3